# Patient Record
Sex: FEMALE | Race: WHITE | NOT HISPANIC OR LATINO | URBAN - METROPOLITAN AREA
[De-identification: names, ages, dates, MRNs, and addresses within clinical notes are randomized per-mention and may not be internally consistent; named-entity substitution may affect disease eponyms.]

---

## 2017-06-11 ENCOUNTER — EMERGENCY (EMERGENCY)
Facility: HOSPITAL | Age: 42
LOS: 1 days | Discharge: HOME | End: 2017-06-11
Attending: EMERGENCY MEDICINE | Admitting: EMERGENCY MEDICINE
Payer: MEDICAID

## 2017-06-11 VITALS
RESPIRATION RATE: 18 BRPM | HEART RATE: 83 BPM | OXYGEN SATURATION: 100 % | SYSTOLIC BLOOD PRESSURE: 144 MMHG | TEMPERATURE: 98 F | DIASTOLIC BLOOD PRESSURE: 86 MMHG

## 2017-06-11 PROCEDURE — 96375 TX/PRO/DX INJ NEW DRUG ADDON: CPT

## 2017-06-11 PROCEDURE — 99284 EMERGENCY DEPT VISIT MOD MDM: CPT | Mod: 25

## 2017-06-11 PROCEDURE — 96374 THER/PROPH/DIAG INJ IV PUSH: CPT

## 2017-06-11 RX ORDER — KETOROLAC TROMETHAMINE 30 MG/ML
15 SYRINGE (ML) INJECTION ONCE
Qty: 0 | Refills: 0 | Status: DISCONTINUED | OUTPATIENT
Start: 2017-06-11 | End: 2017-06-11

## 2017-06-11 RX ORDER — METOCLOPRAMIDE HCL 10 MG
10 TABLET ORAL ONCE
Qty: 0 | Refills: 0 | Status: COMPLETED | OUTPATIENT
Start: 2017-06-11 | End: 2017-06-11

## 2017-06-11 RX ORDER — DIPHENHYDRAMINE HCL 50 MG
50 CAPSULE ORAL ONCE
Qty: 0 | Refills: 0 | Status: COMPLETED | OUTPATIENT
Start: 2017-06-11 | End: 2017-06-11

## 2017-06-11 RX ORDER — SODIUM CHLORIDE 9 MG/ML
2000 INJECTION INTRAMUSCULAR; INTRAVENOUS; SUBCUTANEOUS ONCE
Qty: 0 | Refills: 0 | Status: COMPLETED | OUTPATIENT
Start: 2017-06-11 | End: 2017-06-11

## 2017-06-11 RX ADMIN — Medication 15 MILLIGRAM(S): at 23:13

## 2017-06-11 RX ADMIN — Medication 102 MILLIGRAM(S): at 23:13

## 2017-06-11 RX ADMIN — Medication 104 MILLIGRAM(S): at 23:12

## 2017-06-11 RX ADMIN — SODIUM CHLORIDE 2000 MILLILITER(S): 9 INJECTION INTRAMUSCULAR; INTRAVENOUS; SUBCUTANEOUS at 23:13

## 2017-06-11 NOTE — ED PROVIDER NOTE - PROGRESS NOTE DETAILS
Patients symptoms have improved. In bed and comfortable. Lengthy discussion regarding treatment, discharge instructions, and need for follow up. Patient understands and wishes to go home. HAYDER Alvarez MD

## 2017-06-11 NOTE — ED PROVIDER NOTE - PLAN OF CARE
1) Take Tylenol 325mg tablet, take 2 tablets every 6-8 hours as needed for pain   2) Drink plenty of fluids   3) Follow up with neurology, Dr. Cross in 1-2 days for reevaluation, if you cannot make an appointment call [phone] for an appointment   4) return to the ED for worsening pain, worsening headache, nausea, vomiting, dizziness, lightheadedness, unable to walk, fever greater than 100.4, weakness, facial droop, slurred speech, or if you have any other new, worsening, or concerning symptoms.

## 2017-06-11 NOTE — ED PROVIDER NOTE - CARE PLAN
Principal Discharge DX:	Migraine  Instructions for follow-up, activity and diet:	1) Take Tylenol 325mg tablet, take 2 tablets every 6-8 hours as needed for pain   2) Drink plenty of fluids   3) Follow up with neurology, Dr. Cross in 1-2 days for reevaluation, if you cannot make an appointment call [phone] for an appointment   4) return to the ED for worsening pain, worsening headache, nausea, vomiting, dizziness, lightheadedness, unable to walk, fever greater than 100.4, weakness, facial droop, slurred speech, or if you have any other new, worsening, or concerning symptoms. Principal Discharge DX:	Migraine  Instructions for follow-up, activity and diet:	1) Take Tylenol 325mg tablet, take 2 tablets every 6-8 hours as needed for pain   2) Drink plenty of fluids   3) Follow up with neurology, Dr. Cross in 1-2 days for reevaluation, if you cannot make an appointment call [phone] for an appointment   4) return to the ED for worsening pain, worsening headache, nausea, vomiting, dizziness, lightheadedness, unable to walk, fever greater than 100.4, weakness, facial droop, slurred speech, or if you have any other new, worsening, or concerning symptoms. Principal Discharge DX:	Migraine  Instructions for follow-up, activity and diet:	1) Take Tylenol 325mg tablet, take 2 tablets every 6-8 hours as needed for pain   2) Drink plenty of fluids   3) Follow up with neurology, Dr. Cross in 1-2 days for reevaluation, if you cannot make an appointment call [phone] for an appointment   4) return to the ED for worsening pain, worsening headache, nausea, vomiting, dizziness, lightheadedness, unable to walk, fever greater than 100.4, weakness, facial droop, slurred speech, or if you have any other new, worsening, or concerning symptoms.

## 2017-06-11 NOTE — ED PROVIDER NOTE - ATTENDING CONTRIBUTION TO CARE
patient presenting with gradual onset headache similar to prior headaches but not resolved with excedrin. GCS 15, no focal neuro deficits. neurovascularly intact throughout.   plan for symptomatic tx and reassess. neuro f/u.

## 2017-06-11 NOTE — ED PROVIDER NOTE - OBJECTIVE STATEMENT
41 year old female past medical history migraines, who presents to the ED for headaches for 1 day. Feels like typical migraine but much worse than usual. Patient was at home when headache started, took Excedrin without relief. Gradual onset, took 8 hours to get severe. Reports diffuse headache, worse occipital, radiates to neck, front of faces, phonophobia, photophobia, nausea without vomiting. No fevers but reports chills. No chest pain, or shortness of breath. Took rizatriptan in the past. No blurry vision or diplopia. No trauma.     primary medical doctor: [facility]

## 2017-06-11 NOTE — ED PROVIDER NOTE - MEDICAL DECISION MAKING DETAILS
Typical migraine but worse than usual. No red flag symptoms concerning for SAH or other intracranial hemorrhage. will start with pain control and reeval.

## 2017-06-11 NOTE — ED ADULT NURSE NOTE - OBJECTIVE STATEMENT
40 yo F arrived to the ED c/o HA x1 day; HX of migraines 40 yo F arrived to the ED c/o HA x1 day; HX of migraines; reports today was worse than previous migraines; +nausea; denies vomiting; denies blurred vision; A&Ox3, family @ bedside, pt took Excedrin pta 2x, without relief, vitals stable

## 2017-06-12 VITALS
DIASTOLIC BLOOD PRESSURE: 60 MMHG | SYSTOLIC BLOOD PRESSURE: 100 MMHG | TEMPERATURE: 99 F | HEART RATE: 80 BPM | RESPIRATION RATE: 16 BRPM | OXYGEN SATURATION: 99 %